# Patient Record
Sex: FEMALE | URBAN - NONMETROPOLITAN AREA
[De-identification: names, ages, dates, MRNs, and addresses within clinical notes are randomized per-mention and may not be internally consistent; named-entity substitution may affect disease eponyms.]

---

## 2021-02-19 ENCOUNTER — NURSE TRIAGE (OUTPATIENT)
Dept: CALL CENTER | Facility: HOSPITAL | Age: 21
End: 2021-02-19

## 2021-02-20 NOTE — TELEPHONE ENCOUNTER
"    Reason for Disposition  • Herpes Simplex (Genital), questions about    Additional Information  • Negative: Sexual assault or rape (forced to have sex)  • Negative: Symptoms of STD on penis or scrotum  • Negative: Symptoms of STD on female genital area  • Negative: Needlestick, questions about  • Negative: Other symptoms of a STD  • Negative: Sexual intercourse (in the past 72 hours) with someone who was diagnosed with HIV  • Negative: Patient is worried he or she might have an STD  • Negative: Sexual intercourse (oral, vaginal, or anal) with someone who was diagnosed with a STD  • Negative: Preventing STDs, questions about  • Negative: AIDS (HIV), questions about  • Negative: Bacterial Vaginosis, questions about  • Negative: Chlamydia, questions about  • Negative: Gonorrhea, questions about  • Negative: Hepatitis B, questions about  • Negative: Lice (Genital or Pubic), questions about    Answer Assessment - Initial Assessment Questions  1. SYMPTOMS: \"Do you have any symptoms of an STD?\" If so, ask: \"What are they?\"       Seen Wednesday  At gyn for a vaginal tear, was asked if she wanted std testing and she said no  But decided to   2. TYPE: \"What STD do you have questions about?\"       Dx with herpes  3. EXPOSURE: \"Were you exposed to an STD?\" If so, ask: \"When and what kind of exposure?\"       Unknown but has been in an exclusive relationship for 15 months  4. PREVENTION: \"Do you have questions about preventing AIDS and other STDs?\"       yes    Protocols used: STD (STI) EXPOSURE OR QUESTIONS-PEDIATRIC-      "